# Patient Record
Sex: FEMALE | Race: WHITE | NOT HISPANIC OR LATINO | URBAN - METROPOLITAN AREA
[De-identification: names, ages, dates, MRNs, and addresses within clinical notes are randomized per-mention and may not be internally consistent; named-entity substitution may affect disease eponyms.]

---

## 2021-12-17 ENCOUNTER — NEW PATIENT (OUTPATIENT)
Dept: URBAN - METROPOLITAN AREA CLINIC 14 | Facility: CLINIC | Age: 66
End: 2021-12-17

## 2021-12-17 DIAGNOSIS — G51.0: ICD-10-CM

## 2021-12-17 PROCEDURE — 92004 COMPRE OPH EXAM NEW PT 1/>: CPT

## 2021-12-17 ASSESSMENT — VISUAL ACUITY
OS_PH: 20/30-1
OS_CC: 20/50+1
OU_CC: 20/50+1
OD_PH: 20/40
OD_CC: 20/50+1

## 2021-12-17 ASSESSMENT — TONOMETRY
OS_IOP_MMHG: 15
OD_IOP_MMHG: 16

## 2021-12-17 NOTE — PATIENT DISCUSSION
Ed. patient on findings. Suspect viral etiology related due to shingles vaccine; however, due to h/o melanoma and facial lesion, recommend updated CT scan. Rx Pres Free ATs qHourly, gel/seth QHS, sleeping mask & ceiling fan cessation. Called and left a page for LINDA Holbrook (pt's PCP) to alert of findings and recommendations.

## 2024-07-31 ENCOUNTER — COMPREHENSIVE EXAM (OUTPATIENT)
Dept: URBAN - METROPOLITAN AREA CLINIC 14 | Facility: CLINIC | Age: 69
End: 2024-07-31

## 2024-07-31 DIAGNOSIS — H16.211: ICD-10-CM

## 2024-07-31 DIAGNOSIS — H25.13: ICD-10-CM

## 2024-07-31 DIAGNOSIS — H04.123: ICD-10-CM

## 2024-07-31 PROCEDURE — 92014 COMPRE OPH EXAM EST PT 1/>: CPT

## 2024-07-31 ASSESSMENT — VISUAL ACUITY
OS_BCVA: 20/40
OS_CC: 20/70
OD_CC: 20/70
OD_BCVA: 20/50

## 2024-07-31 ASSESSMENT — TONOMETRY
OD_IOP_MMHG: 14
OS_IOP_MMHG: 14